# Patient Record
Sex: FEMALE | URBAN - METROPOLITAN AREA
[De-identification: names, ages, dates, MRNs, and addresses within clinical notes are randomized per-mention and may not be internally consistent; named-entity substitution may affect disease eponyms.]

---

## 2021-09-14 ENCOUNTER — NURSE NAVIGATOR (OUTPATIENT)
Dept: ONCOLOGY | Facility: CLINIC | Age: 36
End: 2021-09-14

## 2021-09-14 NOTE — PROGRESS NOTES
I received patient's Colon Cancer Health Risk Assessment. Her results show that she is average risk for colon cancer. I sent her an [email or web address] and with my contact information. JUDY